# Patient Record
Sex: FEMALE | Race: WHITE | Employment: FULL TIME | URBAN - METROPOLITAN AREA
[De-identification: names, ages, dates, MRNs, and addresses within clinical notes are randomized per-mention and may not be internally consistent; named-entity substitution may affect disease eponyms.]

---

## 2017-08-30 PROCEDURE — 88175 CYTOPATH C/V AUTO FLUID REDO: CPT | Performed by: NURSE PRACTITIONER

## 2018-08-16 PROBLEM — J45.40 MODERATE PERSISTENT ASTHMA WITHOUT COMPLICATION: Status: ACTIVE | Noted: 2018-08-16

## 2019-08-26 PROCEDURE — 88175 CYTOPATH C/V AUTO FLUID REDO: CPT | Performed by: OBSTETRICS & GYNECOLOGY

## 2019-12-12 ENCOUNTER — HOSPITAL ENCOUNTER (OUTPATIENT)
Dept: PERINATAL CARE | Facility: HOSPITAL | Age: 29
Discharge: HOME | End: 2019-12-12
Attending: OBSTETRICS & GYNECOLOGY
Payer: COMMERCIAL

## 2019-12-12 VITALS — SYSTOLIC BLOOD PRESSURE: 137 MMHG | HEART RATE: 105 BPM | DIASTOLIC BLOOD PRESSURE: 79 MMHG

## 2019-12-12 DIAGNOSIS — O28.1 ABNORMAL BIOCHEMICAL FINDING ON ANTENATAL SCREENING OF MOTHER, ANTEPARTUM: Primary | ICD-10-CM

## 2019-12-12 DIAGNOSIS — Z03.74 SUSPECTED PROBLEM WITH FETAL GROWTH NOT FOUND: ICD-10-CM

## 2019-12-12 DIAGNOSIS — O28.0 ABNORMAL ANTENATAL AFP SCREEN: ICD-10-CM

## 2019-12-12 DIAGNOSIS — O28.1 ABNORMAL BIOCHEMICAL FINDING ON ANTENATAL SCREENING OF MOTHER, ANTEPARTUM: ICD-10-CM

## 2019-12-12 PROCEDURE — 76817 TRANSVAGINAL US OBSTETRIC: CPT | Performed by: OBSTETRICS & GYNECOLOGY

## 2019-12-12 PROCEDURE — 76811 OB US DETAILED SNGL FETUS: CPT | Performed by: OBSTETRICS & GYNECOLOGY

## 2019-12-12 PROCEDURE — 99244 OFF/OP CNSLTJ NEW/EST MOD 40: CPT | Performed by: OBSTETRICS & GYNECOLOGY

## 2019-12-12 NOTE — ADDENDUM NOTE
Encounter addended by: Christopher Lerma on: 12/12/2019 11:50 AM   Actions taken: Visit diagnoses modified, Charge Capture section accepted

## 2019-12-12 NOTE — PROGRESS NOTES
Reason for Consult:   Dear Dr. Alexandra Miles,    Thank you for requesting ultrasound evaluation and maternal fetal medicine consultation on Angy Rodriguez. As you are aware she is a 26year old female with a Traore pregnancy at 19w4d.   A maternal-feta 1   • Albuterol Sulfate  (90 BASE) MCG/ACT Inhalation Aero Soln Inhale 2 puffs into the lungs every 4 to 6 hours as needed.  1 Inhaler 3        HISTORY:  Past Medical History:   Diagnosis Date   • Asthma    • Cervical dysplasia    • Environmental all fold normal.  Pylelectasis absent. No hyperechogenic bowel. Echogenic intracardiac foci absent. Nasal bone present. Choroid plexus cyst absent. Summary of Ultrasound findings:   This is a [place] pregnancy    The fetal measurements are consistent w

## 2019-12-12 NOTE — ADDENDUM NOTE
Encounter addended by:  Kenya Art DO on: 12/12/2019 12:03 PM   Actions taken: Charge Capture section accepted

## 2019-12-13 PROBLEM — O28.0 ABNORMAL MSAFP (MATERNAL SERUM ALPHA-FETOPROTEIN), ELEVATED: Status: ACTIVE | Noted: 2019-12-13

## 2020-04-29 PROBLEM — O28.0 ABNORMAL MSAFP (MATERNAL SERUM ALPHA-FETOPROTEIN), ELEVATED: Status: RESOLVED | Noted: 2019-12-13 | Resolved: 2020-04-28

## 2021-06-15 PROBLEM — O34.219 PREVIOUS CESAREAN SECTION COMPLICATING PREGNANCY: Status: ACTIVE | Noted: 2021-06-15

## 2021-10-18 PROBLEM — F41.8 DEPRESSION WITH ANXIETY: Status: ACTIVE | Noted: 2021-10-18
